# Patient Record
Sex: FEMALE | Race: WHITE | NOT HISPANIC OR LATINO
[De-identification: names, ages, dates, MRNs, and addresses within clinical notes are randomized per-mention and may not be internally consistent; named-entity substitution may affect disease eponyms.]

---

## 2021-02-11 ENCOUNTER — APPOINTMENT (OUTPATIENT)
Dept: HEMATOLOGY ONCOLOGY | Facility: CLINIC | Age: 66
End: 2021-02-11
Payer: COMMERCIAL

## 2021-02-11 VITALS
RESPIRATION RATE: 18 BRPM | TEMPERATURE: 98.5 F | DIASTOLIC BLOOD PRESSURE: 75 MMHG | WEIGHT: 137 LBS | SYSTOLIC BLOOD PRESSURE: 150 MMHG | BODY MASS INDEX: 22.02 KG/M2 | HEART RATE: 72 BPM | HEIGHT: 66 IN | OXYGEN SATURATION: 99 %

## 2021-02-11 DIAGNOSIS — Z80.41 FAMILY HISTORY OF MALIGNANT NEOPLASM OF OVARY: ICD-10-CM

## 2021-02-11 DIAGNOSIS — Z86.2 PERSONAL HISTORY OF DISEASES OF THE BLOOD AND BLOOD-FORMING ORGANS AND CERTAIN DISORDERS INVOLVING THE IMMUNE MECHANISM: ICD-10-CM

## 2021-02-11 DIAGNOSIS — Z85.828 PERSONAL HISTORY OF OTHER MALIGNANT NEOPLASM OF SKIN: ICD-10-CM

## 2021-02-11 DIAGNOSIS — Z86.79 PERSONAL HISTORY OF OTHER DISEASES OF THE CIRCULATORY SYSTEM: ICD-10-CM

## 2021-02-11 DIAGNOSIS — M85.80 OTHER SPECIFIED DISORDERS OF BONE DENSITY AND STRUCTURE, UNSPECIFIED SITE: ICD-10-CM

## 2021-02-11 DIAGNOSIS — F43.21 ADJUSTMENT DISORDER WITH DEPRESSED MOOD: ICD-10-CM

## 2021-02-11 DIAGNOSIS — Z15.01 GENETIC SUSCEPTIBILITY TO MALIGNANT NEOPLASM OF BREAST: ICD-10-CM

## 2021-02-11 DIAGNOSIS — Z00.00 ENCOUNTER FOR GENERAL ADULT MEDICAL EXAMINATION W/OUT ABNORMAL FINDINGS: ICD-10-CM

## 2021-02-11 DIAGNOSIS — K64.4 RESIDUAL HEMORRHOIDAL SKIN TAGS: ICD-10-CM

## 2021-02-11 DIAGNOSIS — Z80.42 FAMILY HISTORY OF MALIGNANT NEOPLASM OF PROSTATE: ICD-10-CM

## 2021-02-11 DIAGNOSIS — Z80.3 FAMILY HISTORY OF MALIGNANT NEOPLASM OF BREAST: ICD-10-CM

## 2021-02-11 DIAGNOSIS — Z72.89 OTHER PROBLEMS RELATED TO LIFESTYLE: ICD-10-CM

## 2021-02-11 DIAGNOSIS — Z85.3 PERSONAL HISTORY OF MALIGNANT NEOPLASM OF BREAST: ICD-10-CM

## 2021-02-11 DIAGNOSIS — Z87.2 PERSONAL HISTORY OF DISEASES OF THE SKIN AND SUBCUTANEOUS TISSUE: ICD-10-CM

## 2021-02-11 DIAGNOSIS — Z80.0 FAMILY HISTORY OF MALIGNANT NEOPLASM OF DIGESTIVE ORGANS: ICD-10-CM

## 2021-02-11 DIAGNOSIS — Z86.39 PERSONAL HISTORY OF OTHER ENDOCRINE, NUTRITIONAL AND METABOLIC DISEASE: ICD-10-CM

## 2021-02-11 PROCEDURE — 99072 ADDL SUPL MATRL&STAF TM PHE: CPT

## 2021-02-11 PROCEDURE — 99215 OFFICE O/P EST HI 40 MIN: CPT

## 2021-02-11 RX ORDER — PANTOPRAZOLE 40 MG/1
40 TABLET, DELAYED RELEASE ORAL
Refills: 0 | Status: ACTIVE | COMMUNITY

## 2021-02-11 NOTE — REVIEW OF SYSTEMS
[Fatigue] : fatigue [Anxiety] : anxiety [Negative] : Endocrine [Fever] : no fever [Chills] : no chills [Night Sweats] : no night sweats [Recent Change In Weight] : ~T no recent weight change [FreeTextEntry5] : + palpation at night seen by pcp  [FreeTextEntry4] : clentched teeth now needs extraction  [FreeTextEntry7] : stable bowels , good BM  [de-identified] : occ dizziness  [de-identified] : sees derm

## 2021-02-11 NOTE — PHYSICAL EXAM
[Normal] : affect appropriate [de-identified] : Bilateral mastectomy with implants in place no masses no adenopathy

## 2021-02-11 NOTE — HISTORY OF PRESENT ILLNESS
[de-identified] : This is a 65-year-old woman with a history of a T1 a N0 triple negative breast cancer BRCA positive she is status post bilateral mastectomies followed by 6 cycles of Taxotere and carboplatin completing in July 2010.\par Patient is also had a bilateral salpingo-oophorectomy.\par \par She follows up with GYN and GI.\par She undergoes bone density testing on a regular basis\par Sees Dr. Sanchez for surgery [de-identified] : feeling  well \par having a lot trouble with daughter her pscyh issues , 50 poounds weight loss , excellent psychiatrist , did endoscopy and colonosocpy no chancer \par had ct scan negative  \par \par other daughter is negative for brca \par niece was dx breast cancer at 31 \par \par other daughter is in [place] school \par high bp , dr boyer retired  will be seeing dr ospina  as pcp  will see in april \par taking ativan for sleep help a littlle if sleeps ok \par started yoga \par a lot of joint pains knee etc \par bone density \par ultrasound  last jan 2021 \par gyn up todate \par colon next year \par no incresae abd pain , kefer \par no bone pain \par \par saw gyn \par \par

## 2021-02-11 NOTE — ASSESSMENT
[FreeTextEntry1] : This is a 65-year-old woman BRCA positive striae of PT1AN0 triple negative breast cancer status post 6 cycles of Taxotere and carboplatin in 2010.  Has been on observation since that time.  Presenting for routine follow-up\par \par \par 1) breast cancer \par Doing well no evidence of disease\par Repeat markers\par check ultrasound of breast rule out leakage, rule out adenopathy\par Ultrasound of left neck\par cont  close observation\par \par 2) brca positive \par Patient is status post bilateral mastectomy and bilateral hysterectomy with salpingo-oophorectomy\par Follow-up with GYN\par Check CA-125\par Check ultrasound of abdomen\par Check CA 19–9\par \par 3) vit d def \par Continue vitamin D\par Check vitamin D level\par \par 4) osteopenia \par cont vit d check level \par encouraged exercise \par check dexa every 2 years \par \par 5) fatigue \par check b12 folate lyme tsh \par \par up todate on colonoscopy \par \par discussed with patient at length\par \par Follow-up in 6 months

## 2021-02-11 NOTE — REASON FOR VISIT
[Follow-Up Visit] : a follow-up [FreeTextEntry2] : She presents for follow-up of triple negative breast cancer

## 2021-02-12 ENCOUNTER — NON-APPOINTMENT (OUTPATIENT)
Age: 66
End: 2021-02-12

## 2021-02-12 LAB
25(OH)D3 SERPL-MCNC: 57.7 NG/ML
ALBUMIN SERPL ELPH-MCNC: 4.9 G/DL
ALP BLD-CCNC: 83 U/L
ALT SERPL-CCNC: 26 U/L
ANION GAP SERPL CALC-SCNC: 19 MMOL/L
AST SERPL-CCNC: 29 U/L
BILIRUB SERPL-MCNC: 0.3 MG/DL
BUN SERPL-MCNC: 13 MG/DL
CALCIUM SERPL-MCNC: 10 MG/DL
CANCER AG125 SERPL-ACNC: 11 U/ML
CANCER AG15-3 SERPL-ACNC: 25.7 U/ML
CANCER AG19-9 SERPL-ACNC: 4 U/ML
CEA SERPL-MCNC: 1.4 NG/ML
CHLORIDE SERPL-SCNC: 101 MMOL/L
CO2 SERPL-SCNC: 21 MMOL/L
CREAT SERPL-MCNC: 0.68 MG/DL
FERRITIN SERPL-MCNC: 101 NG/ML
FOLATE SERPL-MCNC: >20 NG/ML
GLUCOSE SERPL-MCNC: 52 MG/DL
IRON SATN MFR SERPL: 32 %
IRON SERPL-MCNC: 127 UG/DL
POTASSIUM SERPL-SCNC: 4.5 MMOL/L
PROT SERPL-MCNC: 7.3 G/DL
RHEUMATOID FACT SER QL: <10 IU/ML
SODIUM SERPL-SCNC: 142 MMOL/L
TIBC SERPL-MCNC: 394 UG/DL
TSH SERPL-ACNC: 1.05 UIU/ML
UIBC SERPL-MCNC: 267 UG/DL
VIT B12 SERPL-MCNC: 839 PG/ML

## 2021-02-19 LAB
ANA SER IF-ACNC: NEGATIVE
B BURGDOR AB SER-IMP: NEGATIVE
B BURGDOR IGM PATRN SER IB-IMP: NEGATIVE
B BURGDOR18KD IGG SER QL IB: NORMAL
B BURGDOR23KD IGG SER QL IB: NORMAL
B BURGDOR23KD IGM SER QL IB: NORMAL
B BURGDOR28KD IGG SER QL IB: NORMAL
B BURGDOR30KD IGG SER QL IB: NORMAL
B BURGDOR31KD IGG SER QL IB: NORMAL
B BURGDOR39KD IGG SER QL IB: NORMAL
B BURGDOR39KD IGM SER QL IB: NORMAL
B BURGDOR41KD IGG SER QL IB: PRESENT
B BURGDOR41KD IGM SER QL IB: PRESENT
B BURGDOR45KD IGG SER QL IB: NORMAL
B BURGDOR58KD IGG SER QL IB: NORMAL
B BURGDOR66KD IGG SER QL IB: NORMAL
B BURGDOR93KD IGG SER QL IB: NORMAL

## 2021-08-26 ENCOUNTER — APPOINTMENT (OUTPATIENT)
Dept: HEMATOLOGY ONCOLOGY | Facility: CLINIC | Age: 66
End: 2021-08-26
Payer: MEDICARE

## 2021-08-26 VITALS
HEART RATE: 69 BPM | TEMPERATURE: 98.2 F | RESPIRATION RATE: 18 BRPM | OXYGEN SATURATION: 100 % | SYSTOLIC BLOOD PRESSURE: 148 MMHG | DIASTOLIC BLOOD PRESSURE: 71 MMHG | WEIGHT: 133 LBS | HEIGHT: 66 IN | BODY MASS INDEX: 21.38 KG/M2

## 2021-08-26 PROCEDURE — 99214 OFFICE O/P EST MOD 30 MIN: CPT

## 2021-08-26 NOTE — ASSESSMENT
[FreeTextEntry1] : This is a 65-year-old woman BRCA positive striae of PT1AN0 triple negative breast cancer status post 6 cycles of Taxotere and carboplatin in 2010.  Has been on observation since that time.  Presenting for routine follow-up\par \par \par 1) breast cancer \par Doing well no evidence of disease\par As above C 15–3 increase slightly it was decreased on repeat\par Status post recent ultrasound the breast and axilla status post ultrasound of the neck which is stable\par cont  close observation\par \par 2) brca positive \par Patient is status post bilateral mastectomy and bilateral hysterectomy with salpingo-oophorectomy\par Follow-up with GYN\par Normal CA-125 and CA 19–9, repeat all cancer markers today\par Follow-up with gynecologist\par Status post abdomen ultrasound get MRI of the abdomen now to rule out pancreatic lesion, \par \par 3) vit d def \par Continue vitamin D\par Check vitamin D level\par \par 4) osteopenia \par cont vit d check level \par encouraged exercise \par check dexa every 2 years due in November 2022\par \par 5) fatigue \par Work-up negative last visit, repeat iron B12 and folate today as well as TSH\par \par up todate on colonoscopy \par \par discussed with patient at length\par \par Follow-up in 4  months

## 2021-08-26 NOTE — REASON FOR VISIT
[Follow-Up Visit] : a follow-up [FreeTextEntry2] : Patient presents for follow-up of triple negative breast cancer

## 2021-08-26 NOTE — PHYSICAL EXAM
[Normal] : affect appropriate [de-identified] : Small lymph node in the neck continues to be palpable on the left stable [de-identified] : Bilateral mastectomy with implants in place no masses no adenopathy,

## 2021-08-26 NOTE — HISTORY OF PRESENT ILLNESS
[de-identified] : This is a 65-year-old woman with a history of a T1 a N0 triple negative breast cancer BRCA positive she is status post bilateral mastectomies followed by 6 cycles of Taxotere and carboplatin completing in July 2010.\par Patient is also had a bilateral salpingo-oophorectomy.\par \par She follows up with GYN and GI.\par She undergoes bone density testing on a regular basis\par Sees Dr. Sanchez for surgery [de-identified] : Feeling well overall under a lot of stress at home, but overall no new complaints\par sisters  , her daughter had cancer 30's \par her breast friend  from breast\par one daughter  brca tested other one not tested bc she  is special needs \par s/p covid shot ,had high bp after this (9 days) \par has physical with pcp oct 2021 \par arthtritis in knee ( right knee) - had lyme and xray \par neuropathy continues , having restlesslegs \par 2022 dexa due \par up to date gyn \par derm up todate \par Markers show slightly elevated CA 15-3 that on repeat was normal, CA 19–9 and 125 are all normal\par \par 2021 ultrasound of abdomen negative for any hepatosplenic lesions, normal ultrasound, ultrasound the breast\par \par Ultrasound the breast 7 mm hypoechoic lesion at the 6 o'clock position present for over 2 years benign-appearing lymph node in the right axilla benign-appearing lymph nodes also identified in the left axilla no evidence of malignancy\par \par Ultrasound of the neck on the left side shows a normal-appearing cervical lymph node 8 x 9 x 3 mm\par

## 2021-08-29 LAB
25(OH)D3 SERPL-MCNC: 59.1 NG/ML
CANCER AG125 SERPL-ACNC: 11 U/ML
CANCER AG15-3 SERPL-ACNC: 23.9 U/ML
CANCER AG19-9 SERPL-ACNC: 3 U/ML
CEA SERPL-MCNC: 0.9 NG/ML
FERRITIN SERPL-MCNC: 111 NG/ML
FOLATE SERPL-MCNC: >20 NG/ML
VIT B12 SERPL-MCNC: 704 PG/ML

## 2021-10-27 ENCOUNTER — TRANSCRIPTION ENCOUNTER (OUTPATIENT)
Age: 66
End: 2021-10-27

## 2022-03-01 ENCOUNTER — APPOINTMENT (OUTPATIENT)
Dept: HEMATOLOGY ONCOLOGY | Facility: CLINIC | Age: 67
End: 2022-03-01
Payer: MEDICARE

## 2022-03-01 VITALS
TEMPERATURE: 98.5 F | SYSTOLIC BLOOD PRESSURE: 152 MMHG | HEART RATE: 67 BPM | WEIGHT: 135 LBS | DIASTOLIC BLOOD PRESSURE: 77 MMHG | HEIGHT: 66 IN | RESPIRATION RATE: 18 BRPM | BODY MASS INDEX: 21.69 KG/M2 | OXYGEN SATURATION: 99 %

## 2022-03-01 DIAGNOSIS — R53.83 OTHER FATIGUE: ICD-10-CM

## 2022-03-01 DIAGNOSIS — R51.9 HEADACHE, UNSPECIFIED: ICD-10-CM

## 2022-03-01 PROCEDURE — 99214 OFFICE O/P EST MOD 30 MIN: CPT

## 2022-03-01 NOTE — PHYSICAL EXAM
[Normal] : affect appropriate [de-identified] : Small lymph node in the supraclav about 6-7mm [de-identified] : Bilateral mastectomy with implants in place no masses no adenopathy,

## 2022-03-01 NOTE — HISTORY OF PRESENT ILLNESS
[de-identified] : This is a 65-year-old woman with a history of a T1 a N0 triple negative breast cancer BRCA positive she is status post bilateral mastectomies followed by 6 cycles of Taxotere and carboplatin completing in July 2010.\par Patient is also had a bilateral salpingo-oophorectomy.\par \par She follows up with GYN and GI.\par She undergoes bone density testing on a regular basis\par Sees Dr. Sanchez for surgery\par \par Ultrasound the breast 7 mm hypoechoic lesion at the 6 o'clock position present for over 2 years benign-appearing lymph node in the right axilla benign-appearing lymph nodes also identified in the left axilla no evidence of malignancy\par \par also identified in the left axilla no evidence of malignancy\par \par Ultrasound of the neck on the left side shows a normal-appearing cervical lymph node 8 x 9 x 3  [de-identified] : \par dr sterling march 23, for gi follow up \par dexa  due in oct \par having a lot of bad headaches , problem with vision also very nausea , dizziness , never goes away \par had covid test negative \par start amoxicillin didn’t help at all \par went to er bc headache- ct was negative  for any amass or bleeding \par was dc and sent to neuro \par saw dr sears , a neurologist  ( through [facility] )  specialist  in HA \par will be doing MRA , MRV and MRI \par will be doing jessica , mma , crp rf , esr , sjogrens , b12 folate \par starting predinsone  (  taper ) and tizanidine \par

## 2022-03-01 NOTE — ASSESSMENT
[FreeTextEntry1] : This is a 65-year-old woman BRCA positive striae of PT1AN0 triple negative breast cancer status post 6 cycles of Taxotere and carboplatin in 2010.  Has been on observation since that time.  Presenting for routine follow-up\par \par \par 1) breast cancer \par Doing well no evidence of disease\par repeat ca 15-3  and cea \par repeat ultasound of implant/axilla \par repeat ultrasound of neck \par \par 2) brca positive \par Patient is status post bilateral mastectomy and bilateral hysterectomy with salpingo-oophorectomy\par s/p derm \par advise follow up gyn \par mri abd negative in sept \par repeat mri now to evaluate pancreas \par colnosocpy up todate \par repeat cea , 15-3, 19-9  and ca 125 \par \par 3) vit d def \par Continue vitamin D\par Check vitamin D level\par \par 4) osteopenia \par cont vit d check level \par encouraged exercise \par check dexa every 2 years due in November 2022\par \par 5) fatigue \par repeat b12 folate tsh  iron \par \par 6) headache \par agree with mri , mra and mrv \par follow up neuro\par patient will obtain records for me \par \par encouraged exercise and stress relief \par \par dw patient at length \par follow up in 6 months

## 2022-03-01 NOTE — REVIEW OF SYSTEMS
[Fatigue] : fatigue [Loss of Hearing] : loss of hearing [Joint Pain] : joint pain [Dizziness] : dizziness [Negative] : Endocrine [Fever] : no fever [Night Sweats] : no night sweats [FreeTextEntry3] : visiton changes [FreeTextEntry4] : dizziness ,  will be going for wax removal  [FreeTextEntry5] : bp has been ok  [FreeTextEntry9] :  a lot of neck pain ( vertebral bodies arthritis ) , doing exercising , knees and sa joint thumbs  [de-identified] : saw dermatology all was ok  [de-identified] : headaches , new on set  and, new onset dizziness lightheaded  [de-identified] : sleeping ok at night

## 2022-03-04 LAB
CANCER AG125 SERPL-ACNC: 10 U/ML
CANCER AG15-3 SERPL-ACNC: 21.3 U/ML
CANCER AG19-9 SERPL-ACNC: 4 U/ML
CEA SERPL-MCNC: 0.7 NG/ML

## 2022-09-20 ENCOUNTER — RESULT REVIEW (OUTPATIENT)
Age: 67
End: 2022-09-20

## 2022-09-20 ENCOUNTER — APPOINTMENT (OUTPATIENT)
Dept: HEMATOLOGY ONCOLOGY | Facility: CLINIC | Age: 67
End: 2022-09-20

## 2022-09-20 VITALS
TEMPERATURE: 97.9 F | HEIGHT: 66 IN | HEART RATE: 65 BPM | RESPIRATION RATE: 18 BRPM | DIASTOLIC BLOOD PRESSURE: 80 MMHG | OXYGEN SATURATION: 100 % | WEIGHT: 133 LBS | SYSTOLIC BLOOD PRESSURE: 142 MMHG | BODY MASS INDEX: 21.38 KG/M2

## 2022-09-20 PROCEDURE — 99214 OFFICE O/P EST MOD 30 MIN: CPT

## 2022-09-20 NOTE — PHYSICAL EXAM
[Normal] : affect appropriate [de-identified] : prior left subclavian small  LN still palpable  [de-identified] : Bilateral mastectomy with implants. well healed surgical incisions with palpable scar tissue. no masses no lesions. no axillary lymphadenopathy  [de-identified] : no point tenderness upon palpation to xiphoid process.

## 2022-09-20 NOTE — REVIEW OF SYSTEMS
[Fatigue] : fatigue [Loss of Hearing] : loss of hearing [Joint Pain] : joint pain [Dizziness] : dizziness [Fever] : no fever [Night Sweats] : no night sweats [Recent Change In Weight] : ~T recent weight change [Joint Stiffness] : joint stiffness [Insomnia] : insomnia [Negative] : Heme/Lymph [FreeTextEntry2] : unintentional 3 lbs weight loss.  [FreeTextEntry4] : dizziness ,  will be going for wax removal  [FreeTextEntry9] :  vertebral bodies arthritis ) , doing exercise  [de-identified] : " left breast feels smaller." [de-identified] : chronic dizziness with HA

## 2022-09-20 NOTE — ASSESSMENT
[FreeTextEntry1] : This is a 67-year-old woman BRCA positive striae of PT1AN0 triple negative breast cancer status post 6 cycles of Taxotere and carboplatin in 2010.  Has been on observation since that time.  Presenting for routine follow-up\par \par \par 1) breast cancer \par Doing well no evidence of disease\par repeat ca 15-3  and cea \par repeat ultrasound of implant/axilla June 2022- stable benign lesions - repeat 6 months. \par repeat ultrasound of neck  march 2022 stable LN. - repeat in 1 year. \par check MRI breast to evaluate implants and lesions.\par \par 2) brca positive \par Patient is status post bilateral mastectomy and bilateral hysterectomy with salpingo-oophorectomy\par s/p derm \par re-advised follow up gyn \par repeat mri of pancreas March 2022 normal- repeat in 1 year. \par colonscopy up todate - 3 precancerous lesions- repeat in 3 years ( 2025)\par repeat cea , 15-3, 19-9  and ca 125 \par \par 3) vit d def \par Continue vitamin D\par Check vitamin D level\par \par 4) osteopenia \par cont vit d check level \par encouraged exercise \par check dexa every 2 years due in November 2022\par \par 5) weight fluctuation\par check TSH given hx of thyroid nodules\par \par 6) headache \par agree with mri , mra and mrv \par maintain regular follow up with neuro- Dr. Keller\par \par 7) thyroid nodules. \par advised ENT eval. \par has thyroid surgeon?\par \par encouraged exercise and stress relief \par \par dw patient at length \par follow up in 6 months

## 2022-09-20 NOTE — HISTORY OF PRESENT ILLNESS
[de-identified] : This is a 67-year-old woman with a history of a T1 a N0 triple negative breast cancer BRCA positive she is status post bilateral mastectomies followed by 6 cycles of Taxotere and carboplatin completing in July 2010.\par Patient is also had a bilateral salpingo-oophorectomy.\par \par She follows up with GYN and GI.\par She undergoes bone density testing on a regular basis\par Sees Dr. Sanchez for surgery\par \par Ultrasound the breast 7 mm hypoechoic lesion at the 6 o'clock position present for over 2 years benign-appearing lymph node in the right axilla benign-appearing lymph nodes also identified in the left axilla no evidence of malignancy\par \par also identified in the left axilla no evidence of malignancy\par \par Ultrasound of the neck on the left side shows a normal-appearing cervical lymph node 8 x 9 x 3  [de-identified] : \par \par having a rough year. has regular follow up with neurologist for HA - gabapentin 400mg, and PT. improved symptoms. \par has chronic vasomotor rhinitis. dx with allergist. . teeth grinds. wearbraces. \par going back to neuroogist Dr. Keller. had multilpe brain MRIs \par seeing ENT - hoarse . thyroid nodules. surgeon does ultrasounds. \par eating well. good energy \par last dex 11/2020 at [facility]- osteopenia\par last MRI abdomen- BRCA positive 03/17/22 at [facility] \par b/l breast ultrasound march 9, 2022- benign findings at [facility] . repeat ultrasound 09/2022 stable findings. \par neck ultrasound march 9, 2022- benign. decreased in size. 6 x 6 x 2mm  left supraclavicular \par some discomfort to lower aspect of xyphoid process. not worse. intermittent. has GERD

## 2022-09-26 LAB
25(OH)D3 SERPL-MCNC: 62.6 NG/ML
CANCER AG125 SERPL-ACNC: 10 U/ML
CANCER AG15-3 SERPL-ACNC: 26.2 U/ML
CANCER AG19-9 SERPL-ACNC: 5 U/ML
CEA SERPL-MCNC: 0.8 NG/ML

## 2022-09-28 ENCOUNTER — NON-APPOINTMENT (OUTPATIENT)
Age: 67
End: 2022-09-28

## 2022-10-26 ENCOUNTER — NON-APPOINTMENT (OUTPATIENT)
Age: 67
End: 2022-10-26

## 2022-10-27 ENCOUNTER — NON-APPOINTMENT (OUTPATIENT)
Age: 67
End: 2022-10-27

## 2022-10-31 ENCOUNTER — APPOINTMENT (OUTPATIENT)
Dept: PLASTIC SURGERY | Facility: CLINIC | Age: 67
End: 2022-10-31

## 2022-10-31 VITALS
HEIGHT: 66 IN | DIASTOLIC BLOOD PRESSURE: 75 MMHG | SYSTOLIC BLOOD PRESSURE: 145 MMHG | BODY MASS INDEX: 21.21 KG/M2 | OXYGEN SATURATION: 100 % | WEIGHT: 132 LBS | HEART RATE: 72 BPM

## 2022-10-31 PROCEDURE — 99203 OFFICE O/P NEW LOW 30 MIN: CPT

## 2022-10-31 NOTE — HISTORY OF PRESENT ILLNESS
[FreeTextEntry1] : Patient is 12 years following bilateral mastectomy and implant reconstruction.  Has noted increasing discomfort on the left side and now with ultrasound showing intracapsular rupture of the left breast implant and referred for implant removal.  Patient thinks she does not want replacement and so will need removal of the right implant for symmetry.

## 2022-10-31 NOTE — REASON FOR VISIT
[Consultation] : a consultation visit [FreeTextEntry1] : 67 year-old woman referred for treatment of intracapsular rupture left breast implant

## 2022-10-31 NOTE — PHYSICAL EXAM
[NI] : Normal [de-identified] : left implant firm, non tender\par left soft\par no palpable masses

## 2022-11-08 ENCOUNTER — APPOINTMENT (OUTPATIENT)
Dept: PLASTIC SURGERY | Facility: HOSPITAL | Age: 67
End: 2022-11-08

## 2022-11-08 ENCOUNTER — RESULT REVIEW (OUTPATIENT)
Age: 67
End: 2022-11-08

## 2022-11-08 PROCEDURE — 19330 RMVL RUPTURED BREAST IMPLANT: CPT | Mod: LT

## 2022-11-08 PROCEDURE — 19328 RMVL INTACT BREAST IMPLANT: CPT | Mod: RT,59

## 2022-11-14 ENCOUNTER — APPOINTMENT (OUTPATIENT)
Dept: PLASTIC SURGERY | Facility: CLINIC | Age: 67
End: 2022-11-14

## 2022-11-14 VITALS
DIASTOLIC BLOOD PRESSURE: 75 MMHG | TEMPERATURE: 97.9 F | RESPIRATION RATE: 20 BRPM | HEART RATE: 72 BPM | SYSTOLIC BLOOD PRESSURE: 155 MMHG | OXYGEN SATURATION: 100 %

## 2022-11-14 PROCEDURE — 10160 PNXR ASPIR ABSC HMTMA BULLA: CPT | Mod: 78

## 2022-11-14 NOTE — REASON FOR VISIT
[Procedure: _________] : a [unfilled] procedure visit [FreeTextEntry1] : Patient returns with bilateral fluid collection 1 week following removal bilateral breast implants

## 2022-11-14 NOTE — ASSESSMENT
[FreeTextEntry1] : A:\par Bilateral breast seroma\par P:\par Needle aspiration of bilateral seromas \par tolerated well \par instructions reviewed

## 2022-11-14 NOTE — PHYSICAL EXAM
[NI] : Normal [de-identified] : flaps viable incisions clean and intact\par bilateral fluid wave, left more than the right

## 2022-11-14 NOTE — PROCEDURE
[FreeTextEntry1] : Bilateral breast seromas  [FreeTextEntry2] : Needle aspiration of seroma  [FreeTextEntry3] : none  [FreeTextEntry4] : none  [FreeTextEntry5] : none [FreeTextEntry6] : Following a sterile prep, 120 cc fluid aspirated from the left side and 45 cc from the right.  Patient tolerated well  [FreeTextEntry7] : none

## 2022-11-21 ENCOUNTER — APPOINTMENT (OUTPATIENT)
Dept: PLASTIC SURGERY | Facility: CLINIC | Age: 67
End: 2022-11-21

## 2022-11-21 VITALS
SYSTOLIC BLOOD PRESSURE: 150 MMHG | TEMPERATURE: 98.1 F | DIASTOLIC BLOOD PRESSURE: 74 MMHG | OXYGEN SATURATION: 99 % | RESPIRATION RATE: 20 BRPM | HEART RATE: 77 BPM

## 2022-11-21 PROCEDURE — 10160 PNXR ASPIR ABSC HMTMA BULLA: CPT | Mod: 78

## 2022-11-21 NOTE — ASSESSMENT
[FreeTextEntry1] : A:\par Left breast seroma\par P:\par Needle aspiration left breast seroma\par Tolerated well \par Instructions reviewed

## 2022-11-21 NOTE — PROCEDURE
[FreeTextEntry1] : Seroma left breast  [FreeTextEntry2] : Needle aspiration of left breast seroma  [FreeTextEntry3] : None  [FreeTextEntry4] : minimal  [FreeTextEntry5] : none  [FreeTextEntry6] : FOllowing sterile prep, 100 cc serous fluid aspirated form the left breast\par Tolerated well  [FreeTextEntry7] : none

## 2022-11-21 NOTE — REASON FOR VISIT
[Procedure: _________] : a [unfilled] procedure visit [FreeTextEntry1] : Patient returns for follow up, fulness on left side only

## 2022-12-06 ENCOUNTER — NON-APPOINTMENT (OUTPATIENT)
Age: 67
End: 2022-12-06

## 2022-12-07 ENCOUNTER — APPOINTMENT (OUTPATIENT)
Dept: PLASTIC SURGERY | Facility: CLINIC | Age: 67
End: 2022-12-07

## 2022-12-07 VITALS
DIASTOLIC BLOOD PRESSURE: 73 MMHG | TEMPERATURE: 98.3 F | HEART RATE: 73 BPM | RESPIRATION RATE: 20 BRPM | SYSTOLIC BLOOD PRESSURE: 142 MMHG | OXYGEN SATURATION: 99 %

## 2022-12-07 DIAGNOSIS — L76.34 POSTPROCEDURAL SEROMA OF SKIN AND SUBCUTANEOUS TISSUE FOLLOWING OTHER PROCEDURE: ICD-10-CM

## 2022-12-07 PROCEDURE — 99024 POSTOP FOLLOW-UP VISIT: CPT

## 2022-12-07 NOTE — ASSESSMENT
[FreeTextEntry1] : A:\par Doing well post operative\par resolving seroma \par P:\par Reviewed care instruction

## 2022-12-07 NOTE — REASON FOR VISIT
[Follow-Up: _____] : a [unfilled] follow-up visit [FreeTextEntry1] : Patient returns for follow up, resolving seroma left breast

## 2022-12-07 NOTE — PHYSICAL EXAM
[NI] : Normal [de-identified] : Left breast seroma resolving \par no erythema or purulence \par incisions are clean and intact

## 2023-01-18 ENCOUNTER — APPOINTMENT (OUTPATIENT)
Dept: PLASTIC SURGERY | Facility: CLINIC | Age: 68
End: 2023-01-18
Payer: MEDICARE

## 2023-01-18 VITALS
RESPIRATION RATE: 20 BRPM | HEART RATE: 76 BPM | SYSTOLIC BLOOD PRESSURE: 166 MMHG | OXYGEN SATURATION: 98 % | DIASTOLIC BLOOD PRESSURE: 75 MMHG

## 2023-01-18 DIAGNOSIS — T85.43XA LEAKAGE OF BREAST PROSTHESIS AND IMPLANT, INITIAL ENCOUNTER: ICD-10-CM

## 2023-01-18 PROCEDURE — 99024 POSTOP FOLLOW-UP VISIT: CPT

## 2023-01-18 NOTE — REASON FOR VISIT
[Follow-Up: _____] : a [unfilled] follow-up visit [FreeTextEntry1] : Ms. ÁNGELA VINCENT returns for a follow up visit, generally doing well with no complaints and no fevers or chills at home, generally more comfortable with her implants removed

## 2023-02-07 ENCOUNTER — NON-APPOINTMENT (OUTPATIENT)
Age: 68
End: 2023-02-07

## 2023-03-21 ENCOUNTER — RESULT REVIEW (OUTPATIENT)
Age: 68
End: 2023-03-21

## 2023-03-21 ENCOUNTER — APPOINTMENT (OUTPATIENT)
Dept: HEMATOLOGY ONCOLOGY | Facility: CLINIC | Age: 68
End: 2023-03-21
Payer: MEDICARE

## 2023-03-21 VITALS
DIASTOLIC BLOOD PRESSURE: 78 MMHG | WEIGHT: 132 LBS | BODY MASS INDEX: 21.21 KG/M2 | HEIGHT: 66 IN | HEART RATE: 77 BPM | OXYGEN SATURATION: 100 % | RESPIRATION RATE: 18 BRPM | SYSTOLIC BLOOD PRESSURE: 160 MMHG | TEMPERATURE: 98.2 F

## 2023-03-21 PROCEDURE — 99214 OFFICE O/P EST MOD 30 MIN: CPT

## 2023-03-21 NOTE — HISTORY OF PRESENT ILLNESS
[de-identified] : This is a 68-year-old woman with a history of a T1 a N0 triple negative breast cancer BRCA positive she is status post bilateral mastectomies followed by 6 cycles of Taxotere and carboplatin completing in July 2010.\par Patient is also had a bilateral salpingo-oophorectomy.\par \par She follows up with GYN and GI.\par She undergoes bone density testing on a regular basis\par Sees Dr. Sanchez for surgery\par \par Ultrasound the breast 7 mm hypoechoic lesion at the 6 o'clock position present for over 2 years benign-appearing lymph node in the right axilla benign-appearing lymph nodes also identified in the left axilla no evidence of malignancy\par \par also identified in the left axilla no evidence of malignancy\par \par Ultrasound of the neck on the left side shows a normal-appearing cervical lymph node 8 x 9 x 3  [de-identified] : TODAY\par marquis PA went over repeat dexa feb 2023. - stable \par TSH/BSO- overdue for gyn \par pancreatic MRI due this month \par CNY due 2025. fam hx of colon ca \par neuro- Dr. Renee calderon\par follows estefania - jan 2023- for b/l breast implant removal \par goiter- ENT does ultrasound for thyroid \par s/p b/l breast implant removal- Dr. De Oliveira. \par daughters were tested for BRCA- negative \par March 17, 2023- Breast ultrasound b/l: (left) prominent left axillary nodes with thickened cortex measuring up to 4mm. prossibly reactive and probably benign \par (right) 6:00 axis 6mm hypoechoic nodule or complicated cyst, stable for greater than 2 years, benign \par

## 2023-03-21 NOTE — REVIEW OF SYSTEMS
[Joint Pain] : joint pain [Joint Stiffness] : joint stiffness [Insomnia] : insomnia [Negative] : Heme/Lymph [Fever] : no fever [Night Sweats] : no night sweats [Fatigue] : no fatigue [Recent Change In Weight] : ~T no recent weight change [Loss of Hearing] : no loss of hearing [Dizziness] : no dizziness [FreeTextEntry2] : weight stable  [FreeTextEntry4] : see interval hx  [FreeTextEntry9] : not worse  [de-identified] : saw derm for RLE s/p recent MOH's surgery. got temporary rash- now resolved.  [de-identified] : t

## 2023-03-21 NOTE — PHYSICAL EXAM
[Normal] : affect appropriate [de-identified] : b/l breast s/p mastectomy and now implant removal. loose skin noted. no obvious masses nor lesions. small stable left axillary LN ( consistent with prior ultrasound). no palpable LN to R axilla.  [de-identified] : no point tenderness upon palpation to xiphoid process.

## 2023-03-21 NOTE — ASSESSMENT
[FreeTextEntry1] : This is a 68-year-old woman BRCA positive striae of PT1AN0 triple negative breast cancer status post 6 cycles of Taxotere and carboplatin in 2010.  Has been on observation since that time.  Presenting for routine follow-up\par \par \par 1) breast cancer \par Doing well no evidence of disease\par repeat ca 15-3  and cea \par repeat ultrasound of implant/axilla June 2022- stable benign lesions - repeat 6 months. \par repeat ultrasound of neck  march 2022 stable LN. - repeat in 1 year. \par Oct 2022- MRI findings show left implant rupture- referred to plastics . now s/p b/ll breast implant removal + seroma aspiration. \par up to date Plastics- Jan 2023- resolving seroma. \par March 2023 ultrasound axilla stable. repeat in 6 months \par \par 2) brca positive \par Patient is status post bilateral mastectomy and bilateral hysterectomy with salpingo-oophorectomy\par s/p derm \par re-advised follow up gyn \par repeat mri of pancreas March 2022 normal- repeat in 1 year- script given\par colonscopy up todate - 3 precancerous lesions- repeat in 3 years ( 2025)\par repeat cea , 15-3, 19-9  and ca 125 \par \par 3) vit d def \par Continue vitamin D\par Check vitamin D level\par \par 4) osteopenia \par cont vit d check level \par encouraged exercise \par feb 2023- stable osteopenia\par \par 5) weight fluctuation\par weight stable \par TSH WNL - repeat today given hx of thyroid nodules \par \par 6) headache \par maintain regular follow ups with neuro- Dr. Renee guzman\par \par 7) thyroid nodules. \par has thyroid surgeon- does ultrasound with surgeon Q6 months \par \par dw patient at length \par follow up in 6 months  5

## 2023-03-22 LAB
25(OH)D3 SERPL-MCNC: 60.8 NG/ML
CANCER AG125 SERPL-ACNC: 10 U/ML
CANCER AG15-3 SERPL-ACNC: 23.7 U/ML
CANCER AG19-9 SERPL-ACNC: 4 U/ML
CEA SERPL-MCNC: <0.6 NG/ML

## 2023-04-18 ENCOUNTER — NON-APPOINTMENT (OUTPATIENT)
Age: 68
End: 2023-04-18

## 2023-09-26 ENCOUNTER — RESULT REVIEW (OUTPATIENT)
Age: 68
End: 2023-09-26

## 2023-09-26 ENCOUNTER — APPOINTMENT (OUTPATIENT)
Dept: HEMATOLOGY ONCOLOGY | Facility: CLINIC | Age: 68
End: 2023-09-26
Payer: MEDICARE

## 2023-09-26 VITALS
OXYGEN SATURATION: 99 % | RESPIRATION RATE: 18 BRPM | SYSTOLIC BLOOD PRESSURE: 147 MMHG | HEIGHT: 66 IN | HEART RATE: 67 BPM | DIASTOLIC BLOOD PRESSURE: 79 MMHG | WEIGHT: 130 LBS | TEMPERATURE: 97.7 F | BODY MASS INDEX: 20.89 KG/M2

## 2023-09-26 DIAGNOSIS — E04.1 NONTOXIC SINGLE THYROID NODULE: ICD-10-CM

## 2023-09-26 DIAGNOSIS — M85.80 OTHER SPECIFIED DISORDERS OF BONE DENSITY AND STRUCTURE, UNSPECIFIED SITE: ICD-10-CM

## 2023-09-26 DIAGNOSIS — Z15.09 GENETIC SUSCEPTIBILITY TO MALIGNANT NEOPLASM OF BREAST: ICD-10-CM

## 2023-09-26 DIAGNOSIS — Z15.01 GENETIC SUSCEPTIBILITY TO MALIGNANT NEOPLASM OF BREAST: ICD-10-CM

## 2023-09-26 DIAGNOSIS — E55.9 VITAMIN D DEFICIENCY, UNSPECIFIED: ICD-10-CM

## 2023-09-26 PROCEDURE — 99214 OFFICE O/P EST MOD 30 MIN: CPT | Mod: 25

## 2023-09-27 LAB
25(OH)D3 SERPL-MCNC: 65.5 NG/ML
CANCER AG125 SERPL-ACNC: 14 U/ML
CANCER AG15-3 SERPL-ACNC: 25 U/ML
CANCER AG19-9 SERPL-ACNC: 4 U/ML
CEA SERPL-MCNC: <0.6 NG/ML

## 2024-03-26 ENCOUNTER — LABORATORY RESULT (OUTPATIENT)
Age: 69
End: 2024-03-26

## 2024-03-26 ENCOUNTER — RESULT REVIEW (OUTPATIENT)
Age: 69
End: 2024-03-26

## 2024-03-26 ENCOUNTER — APPOINTMENT (OUTPATIENT)
Dept: HEMATOLOGY ONCOLOGY | Facility: CLINIC | Age: 69
End: 2024-03-26
Payer: MEDICARE

## 2024-03-26 VITALS
TEMPERATURE: 98.1 F | BODY MASS INDEX: 21.38 KG/M2 | SYSTOLIC BLOOD PRESSURE: 143 MMHG | WEIGHT: 133 LBS | HEART RATE: 73 BPM | OXYGEN SATURATION: 99 % | HEIGHT: 66 IN | DIASTOLIC BLOOD PRESSURE: 75 MMHG | RESPIRATION RATE: 18 BRPM

## 2024-03-26 DIAGNOSIS — C50.919 MALIGNANT NEOPLASM OF UNSPECIFIED SITE OF UNSPECIFIED FEMALE BREAST: ICD-10-CM

## 2024-03-26 PROCEDURE — 99214 OFFICE O/P EST MOD 30 MIN: CPT

## 2024-03-26 RX ORDER — CYCLOSPORINE 0.5 MG/ML
EMULSION OPHTHALMIC
Refills: 0 | Status: ACTIVE | COMMUNITY

## 2024-03-26 RX ORDER — GABAPENTIN 600 MG/1
600 TABLET, COATED ORAL
Refills: 0 | Status: ACTIVE | COMMUNITY

## 2024-03-26 RX ORDER — LORAZEPAM 1 MG/1
1 TABLET ORAL
Refills: 0 | Status: DISCONTINUED | COMMUNITY
End: 2024-03-26

## 2024-03-27 LAB — 25(OH)D3 SERPL-MCNC: 59.9 NG/ML

## 2024-04-06 PROBLEM — C50.919 TRIPLE NEGATIVE MALIGNANT NEOPLASM OF BREAST: Status: ACTIVE | Noted: 2021-02-11

## 2024-04-06 NOTE — PHYSICAL EXAM
[Normal] : affect appropriate [de-identified] : no point tenderness upon palpation to xiphoid process.  [Fully active, able to carry on all pre-disease performance without restriction] : Status 0 - Fully active, able to carry on all pre-disease performance without restriction [de-identified] : b/l breast s/p mastectomy and now implant removal. loose skin noted. no obvious masses nor lesions. small stable left axillary LN ( consistent with prior ultrasound). no palpable LN to R axilla.

## 2024-04-06 NOTE — REVIEW OF SYSTEMS
[Joint Pain] : joint pain [Joint Stiffness] : joint stiffness [Insomnia] : insomnia [FreeTextEntry4] : see interval hx  [FreeTextEntry9] : not worse  [Fever] : no fever [Night Sweats] : no night sweats [Fatigue] : no fatigue [Recent Change In Weight] : ~T no recent weight change [Loss of Hearing] : no loss of hearing [Dizziness] : no dizziness [Negative] : Musculoskeletal [FreeTextEntry2] : weight stable +HA +trigeminal neuralgia  [de-identified] : saw derm for RLE s/p recent MOH's surgery for BCCa .

## 2024-04-06 NOTE — HISTORY OF PRESENT ILLNESS
[de-identified] : This is a 69-year-old woman with a history of a T1 a N0 triple negative breast cancer BRCA positive she is status post bilateral mastectomies followed by 6 cycles of Taxotere and carboplatin completing in July 2010. Patient is also had a bilateral salpingo-oophorectomy.  She follows up with GYN and GI. She undergoes bone density testing on a regular basis Sees Dr. Sanchez for surgery  Ultrasound the breast 7 mm hypoechoic lesion at the 6 o'clock position present for over 2 years benign-appearing lymph node in the right axilla benign-appearing lymph nodes also identified in the left axilla no evidence of malignancy  also identified in the left axilla no evidence of malignancy  Ultrasound of the neck on the left side shows a normal-appearing cervical lymph node 8 x 9 x 3  [de-identified] : Patient seen and examined and here today for follow up. Feels well overall.  s/p US axilla 9/2023 normal  Sees neuro for trigeminal neuralgia on gabapentin. Daily HA. MRI brain done seeing 5/2024 may need repeat  Due for MRI abd to monitor for BRCA mutation  GYN needs appt s/p RODGER BSO  s/p b/l mastectomy  CNY due 2025. fam hx of colon ca  DEXA 2/23 US L axilla Thyroid nodules seeing surgeon yearly for follow up and monitoring  Saw Cardio caclium score elevated Dr. Bejarano going for ECHO and stress test started on Crestor 20mg  Saw Derm Dr. Chiquis Mendez last week s/p Mohs for BCCa

## 2024-04-06 NOTE — ASSESSMENT
[FreeTextEntry1] : Ms. Chanel is a 69-year-old female BRCA positive striae of PT1AN0 triple negative breast cancer status post 6 cycles of Taxotere and carboplatin in 2010.  Has been on observation since that time.  Presenting for routine follow-up  #Breast Cancer  - JATIN  - BRCA positive striae of PT1AN0 triple negative breast cancer  - status post 6 cycles of Taxotere and carboplatin in 2010.   - s/p b/l mastectomy  - Has been on observation since that time.   - Continue to monitor tumor markers  - US axilla 6/2022 stable benign lesions repeat 6 months.  - s/p repeat ultrasound of neck 3/2022 stable LN. Repeat in 1 year.  - s/p 10/2022 MRI findings show left implant rupture. She was referred to plastics now s/p b/l breast implant removal + seroma aspiration.  - Continues to follow with plastics  - 9/26/23 - vs reviewed. labs drawn in office today. wbc, hgb and plts wnl, cmp wnl.tsh and ft4 wnl. reviewed US L axilla - LN wnl from 9/23. - 3/26/24 vs and CBC reviewed; WBC 5.52, hgb 13.4, plt 251. Remains on surveillance. s/p US axilla 9/2023 normal. Continue to monitor tumor markers, repeated today   #BRCA+  - s/p bilateral mastectomy - s/p bilateral hysterectomy with salpingo-oophorectomy - s/p derm, did have BCCa and Mohs   - Re-advised follow up gyn  - Repeat MRI of pancreas 3/2023 due now has not gone. Reminded - [facility] UTD next due 2025 - Continue to monitor all tumor markers   #Vit d def  - Continue vitamin D - Check vitamin D level  #Osteopenia  - s/p DEXA 2/2023 stable osteopenia  - Repeat 2025, monitor q2y - Limit ETOH  - Maintain healthy BMI  - Ca++ vit d and weight bearing exercise   #Trigeminal Neuralgia  - s/p MRI brain  - On gabapentin  - Follows with Neuro Dr. Renee Green, has follow up 4/2024. Discussed repeating MRI   #Thyroid Nodules  - Follows with surgeon watches yearly  - Check TFTs   #Health Maintenance  - s/p US axilla 9/2023 normal.  - Due for MRI abd to monitor for BRCA mutation  - GYN needs appt s/p RODGER BSO  - s/p b/l mastectomy  - [facility] due 2025. fam hx of colon ca  - DEXA 2/23 - Thyroid nodules seeing surgeon yearly for follow up and monitoring  - Saw Cardio caclium score elevated Dr. Bejarano going for ECHO and stress test started on Crestor 20mg  - Saw Derm Dr. Chiquis Mendez last week s/p Mohs for BCCa   RTC in 6-8  months cbc with diff, cmp, vit D, TSH, FT4, cea , 15-3, 19-9  and ca 125   Case and management discussed with Dr. morejon

## 2024-10-29 ENCOUNTER — APPOINTMENT (OUTPATIENT)
Dept: HEMATOLOGY ONCOLOGY | Facility: CLINIC | Age: 69
End: 2024-10-29
Payer: MEDICARE

## 2024-10-29 ENCOUNTER — RESULT REVIEW (OUTPATIENT)
Age: 69
End: 2024-10-29

## 2024-10-29 VITALS
HEART RATE: 88 BPM | RESPIRATION RATE: 16 BRPM | SYSTOLIC BLOOD PRESSURE: 129 MMHG | BODY MASS INDEX: 21.76 KG/M2 | HEIGHT: 66 IN | WEIGHT: 135.38 LBS | OXYGEN SATURATION: 100 % | DIASTOLIC BLOOD PRESSURE: 71 MMHG | TEMPERATURE: 98 F

## 2024-10-29 DIAGNOSIS — Z17.421 MALIGNANT NEOPLASM OF UNSPECIFIED SITE OF UNSPECIFIED FEMALE BREAST: ICD-10-CM

## 2024-10-29 DIAGNOSIS — Z15.09 GENETIC SUSCEPTIBILITY TO MALIGNANT NEOPLASM OF BREAST: ICD-10-CM

## 2024-10-29 DIAGNOSIS — M85.80 OTHER SPECIFIED DISORDERS OF BONE DENSITY AND STRUCTURE, UNSPECIFIED SITE: ICD-10-CM

## 2024-10-29 DIAGNOSIS — Z15.01 GENETIC SUSCEPTIBILITY TO MALIGNANT NEOPLASM OF BREAST: ICD-10-CM

## 2024-10-29 DIAGNOSIS — C50.919 MALIGNANT NEOPLASM OF UNSPECIFIED SITE OF UNSPECIFIED FEMALE BREAST: ICD-10-CM

## 2024-10-29 DIAGNOSIS — E04.1 NONTOXIC SINGLE THYROID NODULE: ICD-10-CM

## 2024-10-29 PROCEDURE — 99214 OFFICE O/P EST MOD 30 MIN: CPT

## 2024-10-29 RX ORDER — ROSUVASTATIN CALCIUM 20 MG/1
20 TABLET, FILM COATED ORAL
Refills: 0 | Status: ACTIVE | COMMUNITY

## 2024-10-30 LAB
25(OH)D3 SERPL-MCNC: 58.3 NG/ML
CANCER AG125 SERPL-ACNC: 14 U/ML
CANCER AG15-3 SERPL-ACNC: 23.6 U/ML
CANCER AG19-9 SERPL-ACNC: 4 U/ML
CEA SERPL-MCNC: 0.6 NG/ML

## 2025-05-05 ENCOUNTER — NON-APPOINTMENT (OUTPATIENT)
Age: 70
End: 2025-05-05

## 2025-05-06 ENCOUNTER — APPOINTMENT (OUTPATIENT)
Dept: HEMATOLOGY ONCOLOGY | Facility: CLINIC | Age: 70
End: 2025-05-06
Payer: MEDICARE

## 2025-05-06 ENCOUNTER — RESULT REVIEW (OUTPATIENT)
Age: 70
End: 2025-05-06

## 2025-05-06 VITALS
RESPIRATION RATE: 16 BRPM | OXYGEN SATURATION: 100 % | TEMPERATURE: 97.2 F | BODY MASS INDEX: 21.18 KG/M2 | HEIGHT: 66 IN | HEART RATE: 74 BPM | WEIGHT: 131.8 LBS | DIASTOLIC BLOOD PRESSURE: 68 MMHG | SYSTOLIC BLOOD PRESSURE: 129 MMHG

## 2025-05-06 DIAGNOSIS — Z15.09 GENETIC SUSCEPTIBILITY TO MALIGNANT NEOPLASM OF BREAST: ICD-10-CM

## 2025-05-06 DIAGNOSIS — Z17.421 MALIGNANT NEOPLASM OF UNSPECIFIED SITE OF UNSPECIFIED FEMALE BREAST: ICD-10-CM

## 2025-05-06 DIAGNOSIS — R42 DIZZINESS AND GIDDINESS: ICD-10-CM

## 2025-05-06 DIAGNOSIS — E55.9 VITAMIN D DEFICIENCY, UNSPECIFIED: ICD-10-CM

## 2025-05-06 DIAGNOSIS — Z15.01 GENETIC SUSCEPTIBILITY TO MALIGNANT NEOPLASM OF BREAST: ICD-10-CM

## 2025-05-06 DIAGNOSIS — C50.919 MALIGNANT NEOPLASM OF UNSPECIFIED SITE OF UNSPECIFIED FEMALE BREAST: ICD-10-CM

## 2025-05-06 DIAGNOSIS — E04.1 NONTOXIC SINGLE THYROID NODULE: ICD-10-CM

## 2025-05-06 DIAGNOSIS — M85.80 OTHER SPECIFIED DISORDERS OF BONE DENSITY AND STRUCTURE, UNSPECIFIED SITE: ICD-10-CM

## 2025-05-06 PROCEDURE — 99214 OFFICE O/P EST MOD 30 MIN: CPT

## 2025-05-06 PROCEDURE — G2211 COMPLEX E/M VISIT ADD ON: CPT
